# Patient Record
Sex: FEMALE | Race: WHITE | NOT HISPANIC OR LATINO
[De-identification: names, ages, dates, MRNs, and addresses within clinical notes are randomized per-mention and may not be internally consistent; named-entity substitution may affect disease eponyms.]

---

## 2023-02-13 PROBLEM — Z00.00 ENCOUNTER FOR PREVENTIVE HEALTH EXAMINATION: Status: ACTIVE | Noted: 2023-02-13

## 2023-03-21 ENCOUNTER — APPOINTMENT (OUTPATIENT)
Dept: OBGYN | Facility: CLINIC | Age: 23
End: 2023-03-21
Payer: MEDICAID

## 2023-03-21 VITALS
DIASTOLIC BLOOD PRESSURE: 74 MMHG | SYSTOLIC BLOOD PRESSURE: 109 MMHG | WEIGHT: 144 LBS | BODY MASS INDEX: 23.99 KG/M2 | HEIGHT: 65 IN

## 2023-03-21 DIAGNOSIS — Z86.39 PERSONAL HISTORY OF OTHER ENDOCRINE, NUTRITIONAL AND METABOLIC DISEASE: ICD-10-CM

## 2023-03-21 DIAGNOSIS — Q96.9 TURNER'S SYNDROME, UNSPECIFIED: ICD-10-CM

## 2023-03-21 PROCEDURE — 99202 OFFICE O/P NEW SF 15 MIN: CPT

## 2023-03-21 RX ORDER — NORETHINDRONE ACETATE AND ETHINYL ESTRADIOL AND FERROUS FUMARATE 1MG-20(21)
1-20 KIT ORAL DAILY
Qty: 1 | Refills: 11 | Status: ACTIVE | COMMUNITY
Start: 2023-03-21 | End: 1900-01-01

## 2023-03-21 NOTE — HISTORY OF PRESENT ILLNESS
[FreeTextEntry1] : 21 yo with turners on ocp since age 12.\par hypothyroid on synthroid\par endo Dr. Quyen Mcclendon\par getting  5/30\par came to schedule her pills for wedding

## 2023-03-21 NOTE — PLAN
[FreeTextEntry1] : day 3 active pills now stay on till 4/11\par then 4/18-5/9\par then 5/16-wedding\par 1 wk later resume as before\par visit 2-3 months post wedding

## 2023-04-20 DIAGNOSIS — Z76.89 PERSONS ENCOUNTERING HEALTH SERVICES IN OTHER SPECIFIED CIRCUMSTANCES: ICD-10-CM

## 2023-04-20 RX ORDER — NORGESTREL AND ETHINYL ESTRADIOL 0.3-0.03MG
0.3-3 KIT ORAL DAILY
Qty: 1 | Refills: 5 | Status: ACTIVE | COMMUNITY
Start: 2023-04-20 | End: 1900-01-01

## 2023-07-03 DIAGNOSIS — Z78.9 OTHER SPECIFIED HEALTH STATUS: ICD-10-CM

## 2024-01-08 ENCOUNTER — RX RENEWAL (OUTPATIENT)
Age: 24
End: 2024-01-08

## 2024-01-08 RX ORDER — NORGESTREL AND ETHINYL ESTRADIOL 0.3-0.03MG
0.3-3 KIT ORAL DAILY
Qty: 84 | Refills: 0 | Status: ACTIVE | COMMUNITY
Start: 2023-07-03 | End: 1900-01-01